# Patient Record
Sex: FEMALE | Race: WHITE | NOT HISPANIC OR LATINO | URBAN - METROPOLITAN AREA
[De-identification: names, ages, dates, MRNs, and addresses within clinical notes are randomized per-mention and may not be internally consistent; named-entity substitution may affect disease eponyms.]

---

## 2020-07-25 ENCOUNTER — TELEPHONE ENCOUNTER (OUTPATIENT)
Dept: URBAN - METROPOLITAN AREA CLINIC 13 | Facility: CLINIC | Age: 50
End: 2020-07-25

## 2020-07-25 RX ORDER — METOPROLOL SUCCINATE 50 MG/1
TAKE 1 TABLET DAILY TABLET, EXTENDED RELEASE ORAL
Qty: 30 | Refills: 0 | OUTPATIENT
Start: 2019-01-03 | End: 2019-10-09

## 2020-07-25 RX ORDER — OMEPRAZOLE 40 MG/1
TAKE 1 CAPSULE DAILY PRN CAPSULE, DELAYED RELEASE ORAL
Refills: 0 | OUTPATIENT
End: 2019-10-09

## 2020-07-25 RX ORDER — LIOTHYRONINE SODIUM 5 UG/1
TAKE 1 TABLET DAILY TABLET ORAL
Refills: 0 | OUTPATIENT
Start: 2019-01-05 | End: 2019-08-01

## 2020-07-25 RX ORDER — PANTOPRAZOLE SODIUM 40 MG/1
TAKE 1 TABLET DAILY TABLET, DELAYED RELEASE ORAL
Refills: 11 | OUTPATIENT
Start: 2019-01-03 | End: 2019-08-01

## 2020-07-25 RX ORDER — AZELASTINE HYDROCHLORIDE 137 UG/1
USE 1 SPRAY IN EACH NOSTRIL TWICE DAILY SPRAY, METERED NASAL
Refills: 0 | OUTPATIENT
Start: 2019-03-11 | End: 2019-08-01

## 2020-07-26 ENCOUNTER — TELEPHONE ENCOUNTER (OUTPATIENT)
Dept: URBAN - METROPOLITAN AREA CLINIC 13 | Facility: CLINIC | Age: 50
End: 2020-07-26

## 2020-07-26 RX ORDER — FREMANEZUMAB-VFRM 225 MG/1.5ML
INJECTION SUBCUTANEOUS
Qty: 2 | Refills: 0 | Status: ACTIVE | COMMUNITY
Start: 2019-08-23

## 2020-07-26 RX ORDER — METHYLPREDNISOLONE 4 MG/1
TABLET ORAL
Qty: 30 | Refills: 0 | Status: ACTIVE | COMMUNITY
Start: 2019-08-19

## 2020-07-26 RX ORDER — AMOXICILLIN AND CLAVULANATE POTASSIUM 875; 125 MG/1; MG/1
TABLET, FILM COATED ORAL
Qty: 20 | Refills: 0 | Status: ACTIVE | COMMUNITY
Start: 2019-09-08

## 2020-07-26 RX ORDER — CEFUROXIME AXETIL 500 MG/1
TABLET, FILM COATED ORAL
Qty: 20 | Refills: 0 | Status: ACTIVE | COMMUNITY
Start: 2019-09-06

## 2020-07-26 RX ORDER — ZOLPIDEM TARTRATE 12.5 MG/1
TAKE 1 TABLET AT BEDTIME TABLET, EXTENDED RELEASE ORAL
Refills: 0 | Status: ACTIVE | COMMUNITY
Start: 2019-07-31

## 2020-07-26 RX ORDER — FLUCONAZOLE 150 MG/1
TABLET ORAL
Qty: 3 | Refills: 0 | Status: ACTIVE | COMMUNITY
Start: 2019-10-10

## 2020-07-26 RX ORDER — PRAVASTATIN SODIUM 40 MG/1
TAKE 1 TABLET DAILY AS DIRECTED TABLET ORAL
Refills: 0 | Status: ACTIVE | COMMUNITY
Start: 2019-01-29

## 2020-07-26 RX ORDER — PROMETHAZINE HYDROCHLORIDE 12.5 MG/1
TABLET ORAL
Qty: 120 | Refills: 0 | Status: ACTIVE | COMMUNITY
Start: 2019-09-06

## 2020-07-26 RX ORDER — IBUPROFEN 600 MG/1
TAKE 1 TABLET 3 TIMES DAILY WITH FOOD AS NEEDED TABLET ORAL
Refills: 0 | Status: ACTIVE | COMMUNITY
Start: 2019-05-24

## 2020-07-26 RX ORDER — FREMANEZUMAB-VFRM 225 MG/1.5ML
INJECTION SUBCUTANEOUS
Qty: 2 | Refills: 0 | Status: ACTIVE | COMMUNITY
Start: 2019-09-22

## 2020-07-26 RX ORDER — HYOSCYAMINE SULFATE 0.12 MG/1
PLACE 1 TABLET EVERY 4-6 HOURS PRN ABDOMINAL PAIN TABLET, ORALLY DISINTEGRATING ORAL
Qty: 30 | Refills: 0 | Status: ACTIVE | COMMUNITY
Start: 2019-08-01

## 2020-07-26 RX ORDER — LEVOTHYROXINE SODIUM 0.1 MG/1
TAKE 1 TABLET DAILY TABLET ORAL
Refills: 0 | Status: ACTIVE | COMMUNITY
Start: 2019-03-01

## 2020-07-26 RX ORDER — LEVOTHYROXINE SODIUM 0.07 MG/1
TABLET ORAL
Qty: 30 | Refills: 0 | Status: ACTIVE | COMMUNITY
Start: 2019-01-21

## 2020-07-26 RX ORDER — TRAMADOL HYDROCHLORIDE 50 MG/1
TAKE 1 TABLET BY MOUTH EVERY 6 HOURS AS NEEDED FOR PAIN TABLET ORAL
Qty: 50 | Refills: 2 | Status: ACTIVE | COMMUNITY
Start: 2019-07-01

## 2020-07-26 RX ORDER — PREDNISONE 50 MG/1
TABLET ORAL
Qty: 5 | Refills: 0 | Status: ACTIVE | COMMUNITY
Start: 2019-04-17

## 2020-07-26 RX ORDER — CYANOCOBALAMIN 1000 UG/ML
INJECT 1 ML INTRAMUSCULARLY ONCE A MONTH INJECTION INTRAMUSCULAR; SUBCUTANEOUS
Refills: 3 | Status: ACTIVE | COMMUNITY
Start: 2019-01-03

## 2020-07-26 RX ORDER — OMEPRAZOLE 20 MG/1
TAKE ONE CAPSULE BY MOUTH DAILY CAPSULE, DELAYED RELEASE ORAL
Qty: 30 | Refills: 4 | Status: ACTIVE | COMMUNITY
Start: 2019-08-01

## 2020-07-26 RX ORDER — METHYLPREDNISOLONE 4 MG/1
TABLET ORAL
Qty: 42 | Refills: 0 | Status: ACTIVE | COMMUNITY
Start: 2019-06-14

## 2020-07-26 RX ORDER — SERTRALINE HYDROCHLORIDE 112 UG/1
TABLET ORAL
Qty: 30 | Refills: 0 | Status: ACTIVE | COMMUNITY
Start: 2019-08-19

## 2020-07-26 RX ORDER — IPRATROPIUM BROMIDE 42 UG/1
SPRAY, METERED NASAL
Qty: 15 | Refills: 0 | Status: ACTIVE | COMMUNITY
Start: 2019-09-06

## 2020-07-26 RX ORDER — METHYLPHENIDATE HYDROCHLORIDE 20 MG/1
TABLET ORAL
Qty: 60 | Refills: 0 | Status: ACTIVE | COMMUNITY
Start: 2019-01-19

## 2020-08-10 ENCOUNTER — ERX REFILL RESPONSE (OUTPATIENT)
Age: 50
End: 2020-08-10

## 2020-08-10 RX ORDER — OMEPRAZOLE 20 MG/1
TAKE ONE CAPSULE BY MOUTH DAILY CAPSULE, DELAYED RELEASE ORAL
Qty: 30 | Refills: 3

## 2020-09-09 ENCOUNTER — OFFICE VISIT (OUTPATIENT)
Dept: URBAN - METROPOLITAN AREA CLINIC 113 | Facility: CLINIC | Age: 50
End: 2020-09-09

## 2020-09-09 RX ORDER — PROMETHAZINE HYDROCHLORIDE 12.5 MG/1
TABLET ORAL
Qty: 120 | Refills: 0 | Status: ACTIVE | COMMUNITY
Start: 2019-09-06

## 2020-09-09 RX ORDER — METHYLPHENIDATE HYDROCHLORIDE 20 MG/1
TABLET ORAL
Qty: 60 | Refills: 0 | Status: ACTIVE | COMMUNITY
Start: 2019-01-19

## 2020-09-09 RX ORDER — IPRATROPIUM BROMIDE 42 UG/1
SPRAY, METERED NASAL
Qty: 15 | Refills: 0 | Status: ACTIVE | COMMUNITY
Start: 2019-09-06

## 2020-09-09 RX ORDER — FLUCONAZOLE 150 MG/1
TABLET ORAL
Qty: 3 | Refills: 0 | Status: ACTIVE | COMMUNITY
Start: 2019-10-10

## 2020-09-09 RX ORDER — PREDNISONE 50 MG/1
TABLET ORAL
Qty: 5 | Refills: 0 | Status: ACTIVE | COMMUNITY
Start: 2019-04-17

## 2020-09-09 RX ORDER — METHYLPREDNISOLONE 4 MG/1
TABLET ORAL
Qty: 30 | Refills: 0 | Status: ACTIVE | COMMUNITY
Start: 2019-08-19

## 2020-09-09 RX ORDER — SERTRALINE HYDROCHLORIDE 112 UG/1
TABLET ORAL
Qty: 30 | Refills: 0 | Status: ACTIVE | COMMUNITY
Start: 2019-08-19

## 2020-09-09 RX ORDER — CEFUROXIME AXETIL 500 MG/1
TABLET, FILM COATED ORAL
Qty: 20 | Refills: 0 | Status: ACTIVE | COMMUNITY
Start: 2019-09-06

## 2020-09-09 RX ORDER — AMOXICILLIN AND CLAVULANATE POTASSIUM 875; 125 MG/1; MG/1
TABLET, FILM COATED ORAL
Qty: 20 | Refills: 0 | Status: ACTIVE | COMMUNITY
Start: 2019-09-08

## 2020-09-09 RX ORDER — HYOSCYAMINE SULFATE 0.12 MG/1
PLACE 1 TABLET EVERY 4-6 HOURS PRN ABDOMINAL PAIN TABLET, ORALLY DISINTEGRATING ORAL
Qty: 30 | Refills: 0 | Status: ACTIVE | COMMUNITY
Start: 2019-08-01

## 2020-09-09 RX ORDER — LEVOTHYROXINE SODIUM 0.1 MG/1
TAKE 1 TABLET DAILY TABLET ORAL
Refills: 0 | Status: ACTIVE | COMMUNITY
Start: 2019-03-01

## 2020-09-09 RX ORDER — CYANOCOBALAMIN 1000 UG/ML
INJECT 1 ML INTRAMUSCULARLY ONCE A MONTH INJECTION INTRAMUSCULAR; SUBCUTANEOUS
Refills: 3 | Status: ACTIVE | COMMUNITY
Start: 2019-01-03

## 2020-09-09 RX ORDER — LEVOTHYROXINE SODIUM 0.07 MG/1
TABLET ORAL
Qty: 30 | Refills: 0 | Status: ACTIVE | COMMUNITY
Start: 2019-01-21

## 2020-09-09 RX ORDER — OMEPRAZOLE 20 MG/1
TAKE ONE CAPSULE BY MOUTH DAILY CAPSULE, DELAYED RELEASE ORAL
Qty: 30 | Refills: 3 | Status: ACTIVE | COMMUNITY

## 2020-09-09 RX ORDER — ZOLPIDEM TARTRATE 12.5 MG/1
TAKE 1 TABLET AT BEDTIME TABLET, EXTENDED RELEASE ORAL
Refills: 0 | Status: ACTIVE | COMMUNITY
Start: 2019-07-31

## 2020-09-09 RX ORDER — PRAVASTATIN SODIUM 40 MG/1
TAKE 1 TABLET DAILY AS DIRECTED TABLET ORAL
Refills: 0 | Status: ACTIVE | COMMUNITY
Start: 2019-01-29

## 2020-09-09 RX ORDER — METHYLPREDNISOLONE 4 MG/1
TABLET ORAL
Qty: 42 | Refills: 0 | Status: ACTIVE | COMMUNITY
Start: 2019-06-14

## 2020-09-09 RX ORDER — FREMANEZUMAB-VFRM 225 MG/1.5ML
INJECTION SUBCUTANEOUS
Qty: 2 | Refills: 0 | Status: ACTIVE | COMMUNITY
Start: 2019-08-23

## 2020-09-09 RX ORDER — TRAMADOL HYDROCHLORIDE 50 MG/1
TAKE 1 TABLET BY MOUTH EVERY 6 HOURS AS NEEDED FOR PAIN TABLET ORAL
Qty: 50 | Refills: 2 | Status: ACTIVE | COMMUNITY
Start: 2019-07-01

## 2020-09-09 RX ORDER — IBUPROFEN 600 MG/1
TAKE 1 TABLET 3 TIMES DAILY WITH FOOD AS NEEDED TABLET ORAL
Refills: 0 | Status: ACTIVE | COMMUNITY
Start: 2019-05-24

## 2020-09-09 NOTE — HPI-TODAY'S VISIT:
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 She was last seen in the office on 10/9/2019 in follow-up for right upper quadrant and epigastric discomfort associated with bloating.  Prior CT was negative for etiology of pain.  Given GERD symptoms and chronic NSAID use, she was recommended an EGD to evaluate for gastroesophageal pathology of symptoms.  She was to continue antispasmodic as needed for pain.  Regarding fatty liver noted on multiple imaging studies, she was recommended weight reduction with low-fat diet and exercise, along with cholesterol management per her PCP.  EGD was performed 10/15/2019, demonstrating a normal esophagus, normal stomach and normal examined duodenum status post biopsies which were unremarkable.

## 2020-12-16 ENCOUNTER — ERX REFILL RESPONSE (OUTPATIENT)
Age: 50
End: 2020-12-16

## 2020-12-16 RX ORDER — OMEPRAZOLE 20 MG/1
TAKE ONE CAPSULE BY MOUTH DAILY CAPSULE, DELAYED RELEASE ORAL
Qty: 30 | Refills: 2

## 2021-02-26 ENCOUNTER — ERX REFILL RESPONSE (OUTPATIENT)
Dept: URBAN - METROPOLITAN AREA CLINIC 113 | Facility: CLINIC | Age: 51
End: 2021-02-26

## 2021-02-26 RX ORDER — OMEPRAZOLE 20 MG/1
TAKE ONE CAPSULE BY MOUTH DAILY CAPSULE, DELAYED RELEASE ORAL
Qty: 90 | Refills: 0